# Patient Record
(demographics unavailable — no encounter records)

---

## 2024-11-26 NOTE — REASON FOR VISIT
[Initial Consultation] : an initial consultation for [Abnormal Electrocardiogram] : an abnormal EKG [Mother] : mother

## 2024-11-27 NOTE — HISTORY OF PRESENT ILLNESS
[FreeTextEntry1] : KAILEE is a 15 year female who presents for cardiac evaluation of an abnormal ER. [place] [facility] [place] due to a fainting episode. After discharge from the ER they were called that the ECG was abnormal, read as nonspecific T wave changes.  KAILEE woke up in the morning, not feeling well. She was trying to get ready for school, she felt unwell, sat on the toilet to take a break, and then fainted. When she regained consciousness a few seconds later, she reported having difficulty hearing the people around her. She was taken to the doctor who then referred her to the ER. At the ER, her EKG was found to be abnormal and she had pneumonia as shown in a chest X-ray. This was not her first fainting episode as she had fainted a few years earlier during a school event. After the recent episode, she has been feeling dizzy frequently and loses her vision momentarily, especially when she stands up quickly. She has been experiencing these symptoms even more since her pneumonia illness. KAILEE is very physically active in Volleyball- currently 2 hours at least 3 times per week. She drinks ~32 oz of water per day and denies significant caffeine intake.

## 2024-11-27 NOTE — CONSULT LETTER
[Today's Date] : [unfilled] [Name] : Name: [unfilled] [] : : ~~ [Today's Date:] : [unfilled] [Dear  ___:] : Dear Dr. [unfilled]: [Consult] : I had the pleasure of evaluating your patient, [unfilled]. My full evaluation follows. [Consult - Single Provider] : Thank you very much for allowing me to participate in the care of this patient. If you have any questions, please do not hesitate to contact me. [Sincerely,] : Sincerely, [FreeTextEntry4] : Dr. MARCOS ORDOÑEZ MD [FreeTextEntry5] : [place] [FreeTextEntry6] : tel:[phone] [de-identified] : Inessa Goddard MD, FAAP, FACC  Pediatric Cardiologist  of Pediatrics [facility]

## 2024-11-27 NOTE — CARDIOLOGY SUMMARY
[Today's Date] : [unfilled] [FreeTextEntry1] : Normal sinus rhythm without preexcitation or ectopy. Heart rate (bpm): 62 [FreeTextEntry2] : 1. Trivial mitral valve regurgitation. 2. Normal left ventricular size, morphology and systolic function. 3. Normal right ventricular morphology with qualitatively normal size and systolic function. 4. No pericardial effusion.

## 2024-11-27 NOTE — CONSULT LETTER
[Today's Date] : [unfilled] [Name] : Name: [unfilled] [] : : ~~ [Today's Date:] : [unfilled] [Dear  ___:] : Dear Dr. [unfilled]: [Consult] : I had the pleasure of evaluating your patient, [unfilled]. My full evaluation follows. [Consult - Single Provider] : Thank you very much for allowing me to participate in the care of this patient. If you have any questions, please do not hesitate to contact me. [Sincerely,] : Sincerely, [FreeTextEntry4] : Dr. MARCOS ORDOÑEZ MD [FreeTextEntry5] : [place] [FreeTextEntry6] : tel:[phone] [de-identified] : Inessa Goddard MD, FAAP, FACC  Pediatric Cardiologist  of Pediatrics [facility]

## 2024-11-27 NOTE — HISTORY OF PRESENT ILLNESS
[FreeTextEntry1] : KAILEE is a 15 year female who presents for cardiac evaluation of an abnormal ER. [place] [facility] [place] due to a fainting episode. After discharge from the ER they were called that the ECG was abnormal, read as nonspecific T wave changes.  KAILEE woke up in the morning, not feeling well. She was trying to get ready for school, she felt unwell, sat on the toilet to take a break, and then fainted. When she regained consciousness a few seconds later, she reported having difficulty hearing the people around her. She was taken to the doctor who then referred her to the ER. At the ER, her EKG was found to be abnormal and she had pneumonia as shown in a chest X-ray. This was not her first fainting episode as she had fainted a few years earlier during a school event. After the recent episode, she has been feeling dizzy frequently and loses her vision momentarily, especially when she stands up quickly. She has been experiencing these symptoms even more since her pneumonia illness. KAILEE is very physically active in Volleyball- currently 2 hours at least 3 times per week. She drinks ~32 oz of water per day and denies significant caffeine intake.

## 2024-11-27 NOTE — HISTORY OF PRESENT ILLNESS
[FreeTextEntry1] : KAILEE is a 15 year female who presents for cardiac evaluation of an abnormal ER. The ECG was performed in the [facility] [place] on [place] 19th due to a fainting episode. After discharge from the ER they were called that the ECG was abnormal, read as nonspecific T wave changes.  KAILEE woke up in the morning, not feeling well. She was trying to get ready for school, she felt unwell, sat on the toilet to take a break, and then fainted. When she regained consciousness a few seconds later, she reported having difficulty hearing the people around her. She was taken to the doctor who then referred her to the ER. At the ER, her EKG was found to be abnormal and she had pneumonia as shown in a chest X-ray. This was not her first fainting episode as she had fainted a few years earlier during a school event. After the recent episode, she has been feeling dizzy frequently and loses her vision momentarily, especially when she stands up quickly. She has been experiencing these symptoms even more since her pneumonia illness. KAILEE is very physically active in Volleyball- currently 2 hours at least 3 times per week. She drinks ~32 oz of water per day and denies significant caffeine intake.

## 2024-11-27 NOTE — DISCUSSION/SUMMARY
[FreeTextEntry1] : KAILEE has a normal cardiac exam, electrocardiogram and echocardiogram. She has the incidental finding of trivial mitral valve regurgitation which is not hemodynamically significant and may be considered a normal variant.   He previous ECG revealed nonspecific T wave changes which may have been related to her acute illness and her current ECG is normal. The episodes described are consistent with vasovagal syncope and do not appear to be related to a cardiac abnormality.  I reassured KAILEE and her family that the KAILEE's heart is structurally and functionally normal.  I explained the importance of increasing KAILEE's fluid intake with the goal of producing dilute urine, as well as starting buffered salt tablets with instructions to taper the dose to effect. Lifestyle changes including consistent sleep patterns, meals, and daily exercise was emphasized. Caffeine should be avoided due to its diuretic effect. Lastly, we discussed counterpressure techniques as well as the importance of sitting if she ever feels a similar prodrome in order to avoid injury. All physical activities may be performed without restriction and there is no need for routine follow-up unless symptoms persist despite the above measures or if future concerns arise.  [Needs SBE Prophylaxis] : [unfilled] does not need bacterial endocarditis prophylaxis [PE + No Restrictions] : [unfilled] may participate in the entire physical education program without restriction, including all varsity competitive sports.

## 2024-11-27 NOTE — CONSULT LETTER
[Today's Date] : [unfilled] [Name] : Name: [unfilled] [] : : ~~ [Today's Date:] : [unfilled] [Dear  ___:] : Dear Dr. [unfilled]: [Consult] : I had the pleasure of evaluating your patient, [unfilled]. My full evaluation follows. [Consult - Single Provider] : Thank you very much for allowing me to participate in the care of this patient. If you have any questions, please do not hesitate to contact me. [Sincerely,] : Sincerely, [FreeTextEntry4] : Dr. MARCOS ORDOÑEZ MD [FreeTextEntry5] : [place] [FreeTextEntry6] : tel:[phone] [de-identified] : Inessa Goddard MD, FAAP, FACC  Pediatric Cardiologist  of Pediatrics [facility]